# Patient Record
Sex: MALE | Race: BLACK OR AFRICAN AMERICAN | NOT HISPANIC OR LATINO | Employment: UNEMPLOYED | URBAN - METROPOLITAN AREA
[De-identification: names, ages, dates, MRNs, and addresses within clinical notes are randomized per-mention and may not be internally consistent; named-entity substitution may affect disease eponyms.]

---

## 2019-10-17 PROBLEM — D57.1 SICKLE CELL ANEMIA: Status: ACTIVE | Noted: 2019-10-17

## 2019-10-17 PROBLEM — F31.60 BIPOLAR 1 DISORDER, MIXED: Status: ACTIVE | Noted: 2019-10-17

## 2019-10-17 PROBLEM — F32.A DEPRESSION: Status: ACTIVE | Noted: 2019-10-17

## 2019-10-17 PROBLEM — A53.9 SYPHILIS: Status: ACTIVE | Noted: 2019-10-17

## 2019-10-17 PROBLEM — B20 HIV INFECTION: Status: ACTIVE | Noted: 2019-10-17

## 2019-10-17 PROBLEM — Z21 HIV INFECTION: Status: ACTIVE | Noted: 2019-10-17

## 2019-12-27 PROBLEM — N20.1 LEFT URETERAL STONE: Status: ACTIVE | Noted: 2019-12-27

## 2020-01-28 PROBLEM — F43.21 ADJUSTMENT DISORDER WITH DEPRESSED MOOD: Status: ACTIVE | Noted: 2020-01-28

## 2020-02-10 PROBLEM — F43.21 ADJUSTMENT DISORDER WITH DEPRESSED MOOD: Status: RESOLVED | Noted: 2020-01-28 | Resolved: 2020-02-10

## 2020-02-14 PROBLEM — J06.9 VIRAL URI WITH COUGH: Status: ACTIVE | Noted: 2020-02-14

## 2020-03-24 PROBLEM — E55.9 VITAMIN D DEFICIENCY: Status: ACTIVE | Noted: 2020-03-24

## 2020-03-24 PROBLEM — M25.561 ARTHRALGIA OF BOTH KNEES: Status: ACTIVE | Noted: 2020-03-24

## 2020-03-24 PROBLEM — R63.0 DECREASED APPETITE: Status: ACTIVE | Noted: 2020-03-24

## 2020-03-24 PROBLEM — E87.6 HYPOKALEMIA: Status: ACTIVE | Noted: 2020-03-24

## 2020-03-24 PROBLEM — Z00.00 HEALTHCARE MAINTENANCE: Status: ACTIVE | Noted: 2020-03-24

## 2020-03-24 PROBLEM — M25.562 ARTHRALGIA OF BOTH KNEES: Status: ACTIVE | Noted: 2020-03-24

## 2020-03-29 PROBLEM — R76.8 POSITIVE ANA (ANTINUCLEAR ANTIBODY): Status: ACTIVE | Noted: 2020-03-29

## 2020-04-10 PROBLEM — B35.3 TINEA PEDIS OF RIGHT FOOT: Status: ACTIVE | Noted: 2020-04-10

## 2020-07-15 PROBLEM — R63.6 UNDERWEIGHT: Status: ACTIVE | Noted: 2020-07-15

## 2020-07-15 PROBLEM — R06.02 SOB (SHORTNESS OF BREATH): Status: ACTIVE | Noted: 2020-07-15

## 2020-10-08 PROBLEM — R30.0 DYSURIA: Status: ACTIVE | Noted: 2020-10-08

## 2020-10-08 PROBLEM — R10.9 FLANK PAIN: Status: ACTIVE | Noted: 2020-10-08

## 2020-10-14 PROBLEM — R74.8 ELEVATED CPK: Status: ACTIVE | Noted: 2020-10-14

## 2020-12-08 PROBLEM — G47.09 OTHER INSOMNIA: Status: ACTIVE | Noted: 2020-12-08

## 2021-01-14 PROBLEM — A53.9 SYPHILIS: Status: RESOLVED | Noted: 2019-10-17 | Resolved: 2021-01-14

## 2021-01-14 PROBLEM — R06.02 SOB (SHORTNESS OF BREATH): Status: RESOLVED | Noted: 2020-07-15 | Resolved: 2021-01-14

## 2021-03-29 PROBLEM — E87.6 HYPOKALEMIA: Status: RESOLVED | Noted: 2020-03-24 | Resolved: 2021-03-29

## 2021-03-29 PROBLEM — R30.0 DYSURIA: Status: RESOLVED | Noted: 2020-10-08 | Resolved: 2021-03-29

## 2021-03-29 PROBLEM — N20.1 LEFT URETERAL STONE: Status: RESOLVED | Noted: 2019-12-27 | Resolved: 2021-03-29

## 2021-04-12 PROBLEM — R07.89 COSTOCHONDRAL CHEST PAIN: Status: ACTIVE | Noted: 2021-04-12

## 2021-04-22 PROBLEM — N20.0 RIGHT RENAL STONE: Status: ACTIVE | Noted: 2021-04-22

## 2021-05-11 PROBLEM — Z59.19 UNSATISFACTORY LIVING CONDITIONS: Status: ACTIVE | Noted: 2021-05-11

## 2021-05-11 PROBLEM — Z00.00 HEALTHCARE MAINTENANCE: Status: ACTIVE | Noted: 2021-05-11

## 2021-05-14 ENCOUNTER — PATIENT MESSAGE (OUTPATIENT)
Dept: RESEARCH | Facility: HOSPITAL | Age: 26
End: 2021-05-14

## 2021-05-14 ENCOUNTER — RESEARCH ENCOUNTER (OUTPATIENT)
Dept: RESEARCH | Facility: HOSPITAL | Age: 26
End: 2021-05-14

## 2021-06-22 PROBLEM — M62.82 NON-TRAUMATIC RHABDOMYOLYSIS: Status: ACTIVE | Noted: 2021-06-22

## 2021-06-23 PROBLEM — M35.1 MIXED CONNECTIVE TISSUE DISEASE: Status: ACTIVE | Noted: 2021-06-23

## 2021-06-23 PROBLEM — E55.9 VITAMIN D DEFICIENCY: Status: RESOLVED | Noted: 2020-03-24 | Resolved: 2021-06-23

## 2021-06-23 PROBLEM — M79.604 LEG PAIN, BILATERAL: Status: ACTIVE | Noted: 2020-03-24

## 2021-06-23 PROBLEM — M79.605 LEG PAIN, BILATERAL: Status: ACTIVE | Noted: 2020-03-24

## 2021-06-24 PROBLEM — D64.9 NORMOCYTIC ANEMIA: Status: ACTIVE | Noted: 2021-06-24

## 2021-06-24 PROBLEM — M60.852 MYOSITIS OF LEFT THIGH: Status: ACTIVE | Noted: 2021-06-24

## 2021-06-26 PROBLEM — M25.561 ARTHRALGIA OF BOTH KNEES: Status: RESOLVED | Noted: 2020-03-24 | Resolved: 2021-06-26

## 2021-06-26 PROBLEM — M60.9 MYOSITIS: Status: ACTIVE | Noted: 2021-06-24

## 2021-06-26 PROBLEM — M60.852 MYOSITIS OF LEFT THIGH: Status: RESOLVED | Noted: 2021-06-24 | Resolved: 2021-06-26

## 2021-06-26 PROBLEM — M25.562 ARTHRALGIA OF BOTH KNEES: Status: RESOLVED | Noted: 2020-03-24 | Resolved: 2021-06-26

## 2021-06-26 PROBLEM — M62.82 NON-TRAUMATIC RHABDOMYOLYSIS: Status: RESOLVED | Noted: 2021-06-22 | Resolved: 2021-06-26

## 2021-06-26 PROBLEM — E87.6 HYPOKALEMIA: Status: RESOLVED | Noted: 2020-03-24 | Resolved: 2021-06-26

## 2021-06-26 PROBLEM — R74.8 ELEVATED CPK: Status: RESOLVED | Noted: 2020-10-14 | Resolved: 2021-06-26

## 2021-07-20 PROBLEM — M79.604 BILATERAL LEG PAIN: Status: ACTIVE | Noted: 2021-07-20

## 2021-07-20 PROBLEM — M79.605 BILATERAL LEG PAIN: Status: ACTIVE | Noted: 2021-07-20

## 2021-07-20 PROBLEM — R29.898 BILATERAL LEG WEAKNESS: Status: ACTIVE | Noted: 2021-07-20

## 2021-07-26 PROBLEM — M25.562 CHRONIC PAIN OF LEFT KNEE: Status: ACTIVE | Noted: 2021-07-26

## 2021-07-26 PROBLEM — G89.29 CHRONIC PAIN OF LEFT KNEE: Status: ACTIVE | Noted: 2021-07-26

## 2021-07-26 PROBLEM — R74.8 ELEVATED CPK: Status: ACTIVE | Noted: 2021-07-26

## 2021-07-27 ENCOUNTER — PATIENT MESSAGE (OUTPATIENT)
Dept: PRIMARY CARE CLINIC | Facility: CLINIC | Age: 26
End: 2021-07-27

## 2021-08-16 PROBLEM — Z00.00 HEALTHCARE MAINTENANCE: Status: RESOLVED | Noted: 2021-05-11 | Resolved: 2021-08-16

## 2021-11-05 PROBLEM — H40.013 AT LOW RISK FOR OPEN-ANGLE GLAUCOMA IN BOTH EYES: Status: ACTIVE | Noted: 2021-11-05

## 2022-02-10 PROBLEM — N20.0 RIGHT RENAL STONE: Status: RESOLVED | Noted: 2021-04-22 | Resolved: 2022-02-10

## 2022-02-10 PROBLEM — G47.09 OTHER INSOMNIA: Status: RESOLVED | Noted: 2020-12-08 | Resolved: 2022-02-10

## 2022-02-10 PROBLEM — R74.8 ELEVATED CPK: Status: RESOLVED | Noted: 2021-07-26 | Resolved: 2022-02-10

## 2022-02-10 PROBLEM — H40.013 AT LOW RISK FOR OPEN-ANGLE GLAUCOMA IN BOTH EYES: Status: RESOLVED | Noted: 2021-11-05 | Resolved: 2022-02-10

## 2022-02-10 PROBLEM — M79.604 BILATERAL LEG PAIN: Status: RESOLVED | Noted: 2021-07-20 | Resolved: 2022-02-10

## 2022-02-10 PROBLEM — M25.562 ARTHRALGIA OF BOTH KNEES: Status: RESOLVED | Noted: 2020-03-24 | Resolved: 2022-02-10

## 2022-02-10 PROBLEM — M79.605 BILATERAL LEG PAIN: Status: RESOLVED | Noted: 2021-07-20 | Resolved: 2022-02-10

## 2022-02-10 PROBLEM — R29.898 BILATERAL LEG WEAKNESS: Status: RESOLVED | Noted: 2021-07-20 | Resolved: 2022-02-10

## 2022-02-10 PROBLEM — M25.561 ARTHRALGIA OF BOTH KNEES: Status: RESOLVED | Noted: 2020-03-24 | Resolved: 2022-02-10

## 2022-02-10 PROBLEM — R07.89 COSTOCHONDRAL CHEST PAIN: Status: RESOLVED | Noted: 2021-04-12 | Resolved: 2022-02-10

## 2022-02-10 PROBLEM — M60.852 MYOSITIS OF LEFT THIGH: Status: RESOLVED | Noted: 2021-06-24 | Resolved: 2022-02-10

## 2022-02-10 PROBLEM — D64.9 NORMOCYTIC ANEMIA: Status: RESOLVED | Noted: 2021-06-24 | Resolved: 2022-02-10

## 2022-02-12 PROBLEM — R10.9 FLANK PAIN: Status: RESOLVED | Noted: 2020-10-08 | Resolved: 2022-02-12

## 2022-02-12 PROBLEM — A63.0 ANAL WART: Status: ACTIVE | Noted: 2022-02-12

## 2022-03-07 PROBLEM — R10.9 ABDOMINAL PAIN: Status: ACTIVE | Noted: 2022-03-07

## 2022-04-22 PROBLEM — M79.10 MUSCLE PAIN: Status: ACTIVE | Noted: 2022-04-22

## 2022-04-22 PROBLEM — J06.9 UPPER RESPIRATORY TRACT INFECTION: Status: ACTIVE | Noted: 2022-04-22

## 2022-04-22 PROBLEM — R68.83 CHILLS: Status: ACTIVE | Noted: 2022-04-22

## 2022-05-11 PROBLEM — R18.8 FREE FLUID IN PELVIS: Status: ACTIVE | Noted: 2022-05-11
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
## 2022-07-19 PROBLEM — R53.82 CHRONIC FATIGUE: Status: ACTIVE | Noted: 2022-07-19

## 2022-07-19 PROBLEM — G47.00 INSOMNIA: Status: ACTIVE | Noted: 2020-12-08

## 2022-07-22 PROBLEM — R31.9 HEMATURIA: Status: ACTIVE | Noted: 2022-07-22

## 2022-07-22 PROBLEM — R10.9 ABDOMINAL PAIN: Status: ACTIVE | Noted: 2022-07-22

## 2022-09-26 PROBLEM — R42 DIZZY: Status: ACTIVE | Noted: 2022-09-26

## 2022-09-26 PROBLEM — R00.1 BRADYCARDIA ON ECG: Status: ACTIVE | Noted: 2022-09-26

## 2022-09-26 PROBLEM — R94.31 ABNORMAL EKG: Status: ACTIVE | Noted: 2022-09-26

## 2023-02-06 PROBLEM — E88.89 HOFFA'S FAT PAD DISEASE: Status: ACTIVE | Noted: 2023-02-06

## 2023-03-28 ENCOUNTER — PATIENT MESSAGE (OUTPATIENT)
Dept: RESEARCH | Facility: HOSPITAL | Age: 28
End: 2023-03-28

## 2023-07-12 PROBLEM — J06.9 UPPER RESPIRATORY TRACT INFECTION: Status: RESOLVED | Noted: 2022-04-22 | Resolved: 2023-07-12

## 2023-07-12 PROBLEM — B35.3 TINEA PEDIS: Status: RESOLVED | Noted: 2020-04-10 | Resolved: 2023-07-12

## 2023-11-02 PROBLEM — Z87.442 FLANK PAIN WITH HISTORY OF UROLITHIASIS: Status: ACTIVE | Noted: 2020-10-08

## 2023-11-02 PROBLEM — N22 CALCULUS OF URINARY TRACT IN DISEASES CLASSIFIED ELSEWHERE: Status: ACTIVE | Noted: 2021-04-22

## 2023-11-02 PROBLEM — R20.2 PARESTHESIA OF LOWER LIMB: Status: ACTIVE | Noted: 2023-11-02

## 2023-12-06 PROBLEM — S62.327A CLOSED DISPLACED FRACTURE OF SHAFT OF FIFTH METACARPAL BONE OF LEFT HAND: Status: ACTIVE | Noted: 2023-12-06

## 2023-12-13 ENCOUNTER — PATIENT OUTREACH (OUTPATIENT)
Dept: EMERGENCY MEDICINE | Facility: HOSPITAL | Age: 28
End: 2023-12-13

## 2023-12-13 NOTE — PROGRESS NOTES
Call placed per ED Navigator to f/u from last encounter. No answer. ED navigator will follow-up with patient on/around 1-17-24.  Naima Gonzalez

## 2023-12-13 NOTE — PROGRESS NOTES
Call placed to Pt to remind of his upcoming appt at [facility], Infectious Disease today at 3:20. And 12-15-23 at [facility], Urology for 11:30. No answer. Unable to leave a v/m. Email sent.   Naima Gonzalez

## 2024-02-09 PROBLEM — N20.1 RIGHT URETERAL STONE: Status: ACTIVE | Noted: 2024-02-09

## 2024-07-05 PROBLEM — R10.9 ABDOMINAL PAIN: Status: RESOLVED | Noted: 2022-07-22 | Resolved: 2024-07-05

## 2024-07-05 PROBLEM — R53.82 CHRONIC FATIGUE: Status: RESOLVED | Noted: 2022-07-19 | Resolved: 2024-07-05
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
## 2024-07-05 PROBLEM — R42 DIZZY: Status: RESOLVED | Noted: 2022-09-26 | Resolved: 2024-07-05

## 2024-07-05 PROBLEM — R18.8 FREE FLUID IN PELVIS: Status: RESOLVED | Noted: 2022-05-11 | Resolved: 2024-07-05
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
## 2024-07-05 PROBLEM — R10.9 INTRACTABLE ABDOMINAL PAIN: Status: RESOLVED | Noted: 2022-03-07 | Resolved: 2024-07-05

## 2024-07-05 PROBLEM — R68.83 CHILLS: Status: RESOLVED | Noted: 2022-04-22 | Resolved: 2024-07-05

## 2024-07-05 PROBLEM — S62.327A CLOSED DISPLACED FRACTURE OF SHAFT OF FIFTH METACARPAL BONE OF LEFT HAND: Status: RESOLVED | Noted: 2023-12-06 | Resolved: 2024-07-05

## 2024-07-05 PROBLEM — Z87.442 FLANK PAIN WITH HISTORY OF UROLITHIASIS: Status: RESOLVED | Noted: 2020-10-08 | Resolved: 2024-07-05

## 2024-07-05 PROBLEM — R06.02 SHORTNESS OF BREATH: Status: RESOLVED | Noted: 2020-07-15 | Resolved: 2024-07-05

## 2024-07-05 PROBLEM — R00.1 BRADYCARDIA ON ECG: Status: RESOLVED | Noted: 2022-09-26 | Resolved: 2024-07-05

## 2024-07-05 PROBLEM — R10.9 FLANK PAIN WITH HISTORY OF UROLITHIASIS: Status: RESOLVED | Noted: 2020-10-08 | Resolved: 2024-07-05

## 2024-08-01 PROBLEM — M67.52 PLICA OF KNEE, LEFT: Status: ACTIVE | Noted: 2024-08-01

## 2024-10-17 DIAGNOSIS — U07.1 COVID-19 VIRUS DETECTED: ICD-10-CM

## 2025-07-21 PROBLEM — M25.532 LEFT WRIST PAIN: Status: ACTIVE | Noted: 2025-07-21

## 2025-07-21 PROBLEM — S63.592A TFCC (TRIANGULAR FIBROCARTILAGE COMPLEX) TEAR, LEFT, INITIAL ENCOUNTER: Status: ACTIVE | Noted: 2025-07-21

## 2025-08-20 ENCOUNTER — OUTPATIENT CASE MANAGEMENT (OUTPATIENT)
Dept: ADMINISTRATIVE | Facility: OTHER | Age: 30
End: 2025-08-20

## 2025-08-25 ENCOUNTER — OUTPATIENT CASE MANAGEMENT (OUTPATIENT)
Dept: ADMINISTRATIVE | Facility: OTHER | Age: 30
End: 2025-08-25

## 2025-08-29 ENCOUNTER — OUTPATIENT CASE MANAGEMENT (OUTPATIENT)
Dept: ADMINISTRATIVE | Facility: OTHER | Age: 30
End: 2025-08-29

## 2025-09-01 PROBLEM — M25.532 LEFT WRIST PAIN: Status: RESOLVED | Noted: 2025-07-21 | Resolved: 2025-09-01

## 2025-09-03 ENCOUNTER — OUTPATIENT CASE MANAGEMENT (OUTPATIENT)
Dept: ADMINISTRATIVE | Facility: OTHER | Age: 30
End: 2025-09-03